# Patient Record
Sex: MALE | Race: WHITE
[De-identification: names, ages, dates, MRNs, and addresses within clinical notes are randomized per-mention and may not be internally consistent; named-entity substitution may affect disease eponyms.]

---

## 2018-04-22 ENCOUNTER — HOSPITAL ENCOUNTER (EMERGENCY)
Dept: HOSPITAL 43 - DL.ED | Age: 38
Discharge: HOME | End: 2018-04-22
Payer: COMMERCIAL

## 2018-04-22 DIAGNOSIS — R74.8: ICD-10-CM

## 2018-04-22 DIAGNOSIS — R31.0: Primary | ICD-10-CM

## 2018-04-22 DIAGNOSIS — F17.210: ICD-10-CM

## 2018-04-22 LAB
CHLORIDE SERPL-SCNC: 102 MMOL/L (ref 101–111)
SODIUM SERPL-SCNC: 136 MMOL/L (ref 135–145)

## 2018-04-22 NOTE — EDM.PDOC
ED HPI GENERAL MEDICAL PROBLEM





- General


Chief Complaint: Genitourinary Problem


Stated Complaint: [id number] BLOOD IN URINE


Time Seen by Provider: 04/22/18 15:00


Source of Information: Reports: Patient


History Limitations: Reports: No Limitations





- History of Present Illness


INITIAL COMMENTS - FREE TEXT/NARRATIVE: 


Patient presented to the ED with one episode of gross hematuria. He worked out 

this AM and after working out noted urine that was initially brown and then 

red. He has not had this before. No flank pain. No abdominal pain. He does have 

some groin pain that he attributes to a groin strain. No fevers, chills or 

sweats. He has been drinking more water and noted increased in urination but no 

true urinary frequency. No personal or family history of kidney issues. No 

other complaints. No alleviating or aggravating factors. 





No history of supplements. No OTC medications.








- Related Data


 Allergies











Allergy/AdvReac Type Severity Reaction Status Date / Time


 


seasonal Allergy  Sneezing Uncoded 04/22/18 14:15











Home Meds: 


 Home Meds





. [No Known Home Meds]  04/22/18 [History]


Ibuprofen 600 mg PO ASDIRECTED PRN 04/22/18 [History]











Past Medical History


HEENT History: Reports: None


Cardiovascular History: Reports: None


Respiratory History: Reports: None


Gastrointestinal History: Reports: None


Genitourinary History: Reports: None


Musculoskeletal History: Reports: None


Neurological History: Reports: None


Psychiatric History: Reports: None


Endocrine/Metabolic History: Reports: None


Hematologic History: Reports: None


Immunologic History: Reports: None


Oncologic (Cancer) History: Reports: None


Dermatologic History: Reports: None





- Infectious Disease History


Infectious Disease History: Reports: Chicken Pox





- Past Surgical History


Head Surgeries/Procedures: Reports: None


GI Surgical History: Reports: Hernia Repair/Other





Social & Family History





- Family History


Other Oncologic Family History: Hemochromatosis





- Tobacco Use


Smoking Status *Q: Current Every Day Smoker


Years of Tobacco use: 20


Packs/Tins Daily: 1


Second Hand Smoke Exposure: No





- Caffeine Use


Caffeine Use: Reports: Coffee





- Recreational Drug Use


Recreational Drug Use: No





ED ROS GENERAL





- Review of Systems


Review Of Systems: ROS reveals no pertinent complaints other than HPI.





ED EXAM, RENAL/





- Physical Exam


Exam: See Below


Exam Limited By: No Limitations


General Appearance: Alert, WD/WN, No Apparent Distress


Eye Exam: Bilateral Eye: Normal Inspection


Ears: Normal External Exam, Hearing Grossly Normal


Nose: Normal Inspection, Normal Mucosa


Throat/Mouth: Normal Inspection, Normal Lips, Normal Oropharynx


Head: Atraumatic, Normocephalic


Neck: Normal Inspection, Supple


Respiratory/Chest: No Respiratory Distress, No Accessory Muscle Use


Cardiovascular: Regular Rate, Rhythm, No Murmur


GI/Abdominal: Normal Bowel Sounds, Soft, Non-Tender


Back Exam: Normal Inspection, Full Range of Motion.  No: CVA Tenderness (L), 

CVA Tenderness (R)


Extremities: Normal Inspection, Non-Tender, No Pedal Edema


Neurological: Alert, Oriented, CN II-XII Intact


Psychiatric: Normal Affect, Normal Mood


Skin Exam: Warm, Dry, Intact


Lymphatic: No Adenopathy





Course





- Vital Signs


Last Recorded V/S: 


 Last Vital Signs











Temp  36.6 C   04/22/18 14:09


 


Pulse  86   04/22/18 14:09


 


Resp  16   04/22/18 14:09


 


BP  138/83   04/22/18 14:18


 


Pulse Ox  99   04/22/18 14:09














- Orders/Labs/Meds


Labs: 


 Laboratory Tests











  04/22/18 04/22/18 Range/Units





  14:04 16:15 


 


Sodium   136  (135-145)  mmol/L


 


Potassium   4.0  (3.6-5.0)  mmol/L


 


Chloride   102  (101-111)  mmol/L


 


Carbon Dioxide   26.0  (21.0-31.0)  mmol/L


 


Anion Gap   12.0  


 


BUN   16  (7-18)  mg/dL


 


Creatinine   1.0  (0.6-1.3)  mg/dL


 


Est Cr Clr Drug Dosing   110.97  mL/min


 


Estimated GFR (MDRD)   > 60  


 


Glucose   91  ()  mg/dL


 


Calcium   9.3  (8.4-10.2)  mg/dl


 


Creatine Kinase   198 H  ()  IU/L


 


Urine Color  Yellow   (YELLOW)  


 


Urine Appearance  Clear   (CLEAR)  


 


Urine pH  7.0   (5.0-9.0)  


 


Ur Specific Gravity  1.020   (1.005-1.030)  


 


Urine Protein  Negative   (NEGATIVE)  


 


Urine Glucose (UA)  Negative   (NEGATIVE)  


 


Urine Ketones  Negative   (NEGATIVE)  


 


Urine Occult Blood  Small H   (NEGATIVE)  


 


Urine Nitrite  Negative   (NEGATIVE)  


 


Urine Bilirubin  Negative   (NEGATIVE)  


 


Urine Urobilinogen  0.2   (0.2-1.0)  mg/dL


 


Ur Leukocyte Esterase  Negative   (NEGATIVE)  


 


Urine RBC  5-10 H   /HPF


 


Urine WBC  0-5   (0-5/HPF)  /HPF


 


Ur Epithelial Cells  Rare   /HPF


 


Amorphous Sediment  Few   (0/HPF)  /HPF


 


Urine Bacteria  Not seen   (0-FEW/HPF)  /HPF














- Re-Assessments/Exams


Free Text/Narrative Re-Assessment/Exam: 


Discussed laboratory results with patient and support person. In agreement with 

follow up and hydration


04/22/18 17:51








Departure





- Departure


Time of Disposition: 17:21


Disposition: Home, Self-Care 01


Clinical Impression: 


 Elevated CPK





Hematuria


Qualifiers:


 Hematuria type: gross Qualified Code(s): R31.0 - Gross hematuria








- Discharge Information


Instructions:  Dehydration, Adult, Easy-to-Read


Forms:  ED Department Discharge


Additional Instructions: 


Hydrate. It is very important to make a follow up appointment with a PCP. You 

should at least have another urine to follow up on the blood in your urine.